# Patient Record
Sex: FEMALE | Race: WHITE | NOT HISPANIC OR LATINO
[De-identification: names, ages, dates, MRNs, and addresses within clinical notes are randomized per-mention and may not be internally consistent; named-entity substitution may affect disease eponyms.]

---

## 2017-06-13 ENCOUNTER — APPOINTMENT (OUTPATIENT)
Dept: DERMATOLOGY | Facility: CLINIC | Age: 52
End: 2017-06-13

## 2021-05-12 ENCOUNTER — APPOINTMENT (OUTPATIENT)
Dept: DERMATOLOGY | Facility: CLINIC | Age: 56
End: 2021-05-12
Payer: COMMERCIAL

## 2021-05-12 PROCEDURE — 99072 ADDL SUPL MATRL&STAF TM PHE: CPT

## 2021-05-12 PROCEDURE — 99203 OFFICE O/P NEW LOW 30 MIN: CPT

## 2022-04-24 ENCOUNTER — INPATIENT (INPATIENT)
Facility: HOSPITAL | Age: 57
LOS: 0 days | Discharge: HOME | DRG: 395 | End: 2022-04-25
Attending: HOSPITALIST | Admitting: STUDENT IN AN ORGANIZED HEALTH CARE EDUCATION/TRAINING PROGRAM
Payer: COMMERCIAL

## 2022-04-24 VITALS
OXYGEN SATURATION: 97 % | WEIGHT: 130.07 LBS | SYSTOLIC BLOOD PRESSURE: 147 MMHG | TEMPERATURE: 98 F | HEART RATE: 97 BPM | HEIGHT: 62 IN | RESPIRATION RATE: 16 BRPM | DIASTOLIC BLOOD PRESSURE: 96 MMHG

## 2022-04-24 DIAGNOSIS — K22.2 ESOPHAGEAL OBSTRUCTION: ICD-10-CM

## 2022-04-24 LAB
ALBUMIN SERPL ELPH-MCNC: 4.5 G/DL — SIGNIFICANT CHANGE UP (ref 3.3–5.2)
ALP SERPL-CCNC: 73 U/L — SIGNIFICANT CHANGE UP (ref 40–120)
ALT FLD-CCNC: 39 U/L — HIGH
ANION GAP SERPL CALC-SCNC: 19 MMOL/L — HIGH (ref 5–17)
APTT BLD: 27.6 SEC — SIGNIFICANT CHANGE UP (ref 27.5–35.5)
AST SERPL-CCNC: 29 U/L — SIGNIFICANT CHANGE UP
BASOPHILS # BLD AUTO: 0.08 K/UL — SIGNIFICANT CHANGE UP (ref 0–0.2)
BASOPHILS NFR BLD AUTO: 1.1 % — SIGNIFICANT CHANGE UP (ref 0–2)
BILIRUB SERPL-MCNC: 0.3 MG/DL — LOW (ref 0.4–2)
BUN SERPL-MCNC: 13.1 MG/DL — SIGNIFICANT CHANGE UP (ref 8–20)
CALCIUM SERPL-MCNC: 9.2 MG/DL — SIGNIFICANT CHANGE UP (ref 8.6–10.2)
CHLORIDE SERPL-SCNC: 102 MMOL/L — SIGNIFICANT CHANGE UP (ref 98–107)
CO2 SERPL-SCNC: 19 MMOL/L — LOW (ref 22–29)
CREAT SERPL-MCNC: 0.63 MG/DL — SIGNIFICANT CHANGE UP (ref 0.5–1.3)
EGFR: 105 ML/MIN/1.73M2 — SIGNIFICANT CHANGE UP
EOSINOPHIL # BLD AUTO: 0.27 K/UL — SIGNIFICANT CHANGE UP (ref 0–0.5)
EOSINOPHIL NFR BLD AUTO: 3.6 % — SIGNIFICANT CHANGE UP (ref 0–6)
FLUAV AG NPH QL: SIGNIFICANT CHANGE UP
FLUBV AG NPH QL: SIGNIFICANT CHANGE UP
GLUCOSE SERPL-MCNC: 123 MG/DL — HIGH (ref 70–99)
HCT VFR BLD CALC: 38.6 % — SIGNIFICANT CHANGE UP (ref 34.5–45)
HGB BLD-MCNC: 12.9 G/DL — SIGNIFICANT CHANGE UP (ref 11.5–15.5)
IMM GRANULOCYTES NFR BLD AUTO: 0.4 % — SIGNIFICANT CHANGE UP (ref 0–1.5)
INR BLD: 1.01 RATIO — SIGNIFICANT CHANGE UP (ref 0.88–1.16)
LYMPHOCYTES # BLD AUTO: 2.43 K/UL — SIGNIFICANT CHANGE UP (ref 1–3.3)
LYMPHOCYTES # BLD AUTO: 32.5 % — SIGNIFICANT CHANGE UP (ref 13–44)
MCHC RBC-ENTMCNC: 30.6 PG — SIGNIFICANT CHANGE UP (ref 27–34)
MCHC RBC-ENTMCNC: 33.4 GM/DL — SIGNIFICANT CHANGE UP (ref 32–36)
MCV RBC AUTO: 91.5 FL — SIGNIFICANT CHANGE UP (ref 80–100)
MONOCYTES # BLD AUTO: 0.45 K/UL — SIGNIFICANT CHANGE UP (ref 0–0.9)
MONOCYTES NFR BLD AUTO: 6 % — SIGNIFICANT CHANGE UP (ref 2–14)
NEUTROPHILS # BLD AUTO: 4.21 K/UL — SIGNIFICANT CHANGE UP (ref 1.8–7.4)
NEUTROPHILS NFR BLD AUTO: 56.4 % — SIGNIFICANT CHANGE UP (ref 43–77)
PLATELET # BLD AUTO: 399 K/UL — SIGNIFICANT CHANGE UP (ref 150–400)
POTASSIUM SERPL-MCNC: 3.1 MMOL/L — LOW (ref 3.5–5.3)
POTASSIUM SERPL-SCNC: 3.1 MMOL/L — LOW (ref 3.5–5.3)
PROT SERPL-MCNC: 7 G/DL — SIGNIFICANT CHANGE UP (ref 6.6–8.7)
PROTHROM AB SERPL-ACNC: 11.7 SEC — SIGNIFICANT CHANGE UP (ref 10.5–13.4)
RBC # BLD: 4.22 M/UL — SIGNIFICANT CHANGE UP (ref 3.8–5.2)
RBC # FLD: 12.2 % — SIGNIFICANT CHANGE UP (ref 10.3–14.5)
RSV RNA NPH QL NAA+NON-PROBE: SIGNIFICANT CHANGE UP
SARS-COV-2 RNA SPEC QL NAA+PROBE: SIGNIFICANT CHANGE UP
SODIUM SERPL-SCNC: 140 MMOL/L — SIGNIFICANT CHANGE UP (ref 135–145)
WBC # BLD: 7.47 K/UL — SIGNIFICANT CHANGE UP (ref 3.8–10.5)
WBC # FLD AUTO: 7.47 K/UL — SIGNIFICANT CHANGE UP (ref 3.8–10.5)

## 2022-04-24 PROCEDURE — 99285 EMERGENCY DEPT VISIT HI MDM: CPT

## 2022-04-24 PROCEDURE — 71045 X-RAY EXAM CHEST 1 VIEW: CPT | Mod: 26

## 2022-04-24 PROCEDURE — 99223 1ST HOSP IP/OBS HIGH 75: CPT

## 2022-04-24 RX ORDER — SODIUM CHLORIDE 9 MG/ML
1000 INJECTION, SOLUTION INTRAVENOUS
Refills: 0 | Status: DISCONTINUED | OUTPATIENT
Start: 2022-04-24 | End: 2022-04-24

## 2022-04-24 RX ORDER — PANTOPRAZOLE SODIUM 20 MG/1
40 TABLET, DELAYED RELEASE ORAL DAILY
Refills: 0 | Status: DISCONTINUED | OUTPATIENT
Start: 2022-04-24 | End: 2022-04-25

## 2022-04-24 RX ORDER — GLUCAGON INJECTION, SOLUTION 0.5 MG/.1ML
1 INJECTION, SOLUTION SUBCUTANEOUS ONCE
Refills: 0 | Status: COMPLETED | OUTPATIENT
Start: 2022-04-24 | End: 2022-04-24

## 2022-04-24 RX ORDER — PANTOPRAZOLE SODIUM 20 MG/1
40 TABLET, DELAYED RELEASE ORAL ONCE
Refills: 0 | Status: COMPLETED | OUTPATIENT
Start: 2022-04-24 | End: 2022-04-24

## 2022-04-24 RX ORDER — SODIUM CHLORIDE 9 MG/ML
1000 INJECTION INTRAMUSCULAR; INTRAVENOUS; SUBCUTANEOUS
Refills: 0 | Status: DISCONTINUED | OUTPATIENT
Start: 2022-04-24 | End: 2022-04-25

## 2022-04-24 RX ORDER — POTASSIUM CHLORIDE 20 MEQ
10 PACKET (EA) ORAL
Refills: 0 | Status: COMPLETED | OUTPATIENT
Start: 2022-04-24 | End: 2022-04-24

## 2022-04-24 RX ADMIN — PANTOPRAZOLE SODIUM 40 MILLIGRAM(S): 20 TABLET, DELAYED RELEASE ORAL at 23:23

## 2022-04-24 RX ADMIN — GLUCAGON INJECTION, SOLUTION 1 MILLIGRAM(S): 0.5 INJECTION, SOLUTION SUBCUTANEOUS at 19:00

## 2022-04-24 RX ADMIN — Medication 100 MILLIEQUIVALENT(S): at 22:57

## 2022-04-24 RX ADMIN — SODIUM CHLORIDE 75 MILLILITER(S): 9 INJECTION INTRAMUSCULAR; INTRAVENOUS; SUBCUTANEOUS at 23:25

## 2022-04-24 RX ADMIN — Medication 100 MILLIEQUIVALENT(S): at 21:07

## 2022-04-24 RX ADMIN — PANTOPRAZOLE SODIUM 40 MILLIGRAM(S): 20 TABLET, DELAYED RELEASE ORAL at 20:40

## 2022-04-24 NOTE — H&P ADULT - HISTORY OF PRESENT ILLNESS
57yoF hx HTN, GERD, anxiety disorder presenting with globus sensation in throat after eating chicken and artichoke pizza for dinner around 6PM tonight.  Pt attempted to drink water which she immediately regurgitated and came to hospital to seek evaluation due to persistent symptoms.  Pt denies any cough, excessive amount of secretions, chest pain or difficulty breathing.  She denies any similar episodes like this happening in the past but reports that her son did have episodes of recurrent food impaction. She reports that her son had an endoscopy which reportedly revealed inflammation. 57yoF hx HTN, GERD, anxiety disorder presenting with globus sensation in throat after eating chicken and artichoke pizza for dinner around 6PM tonight.  Pt attempted to drink water which she immediately regurgitated and came to hospital to seek evaluation due to persistent symptoms.  Pt denies any cough, excessive amount of secretions, chest pain or difficulty breathing.  She denies any similar episodes like this happening in the past but reports that her son did have what appears to be recurrent episodes of food impaction. She reports that her son had an endoscopy a few years ago which reportedly revealed inflammation.

## 2022-04-24 NOTE — PATIENT PROFILE ADULT - FALL HARM RISK - UNIVERSAL INTERVENTIONS
Bed in lowest position, wheels locked, appropriate side rails in place/Call bell, personal items and telephone in reach/Instruct patient to call for assistance before getting out of bed or chair/Non-slip footwear when patient is out of bed/[place] to call system/Physically safe environment - no spills, clutter or unnecessary equipment/Purposeful Proactive Rounding/Room/bathroom lighting operational, light cord in reach

## 2022-04-24 NOTE — ED ADULT NURSE NOTE - OBJECTIVE STATEMENT
pt with reports of having food stuck in her throat x 1 hour, states cannot tolerate water to help it pass. pt with reports of throat pain, no SOB or diff handling secretions. AOX3.

## 2022-04-24 NOTE — H&P ADULT - NSHPPHYSICALEXAM_GEN_ALL_CORE
Vital Signs Last 24 Hrs  T(C): 36.5 (24 Apr 2022 21:00), Max: 36.9 (24 Apr 2022 18:19)  T(F): 97.7 (24 Apr 2022 21:00), Max: 98.5 (24 Apr 2022 18:19)  HR: 100 (24 Apr 2022 21:00) (88 - 100)  BP: 138/86 (24 Apr 2022 21:00) (136/80 - 147/96)  BP(mean): --  RR: 17 (24 Apr 2022 21:00) (16 - 17)  SpO2: 96% (24 Apr 2022 21:00) (96% - 97%)    GENERAL:  Well-appearing, not in acute distress  EYES:  Clear conjunctiva, extraocular movement intact  ENT: Moist mucous membranes  RESP:  Non-labored breathing pattern, lungs clear to ausculation  CV: Regular rate and rhythm, no murmurs appreciated, no lower extremity edema  GI: Soft, non-tender, non-distended  NEURO: Awake, alert, conversant, upper and lower extremity strength 5/5, light touch sensation grossly intact  PSYCH: Calm, cooperative  SKIN: No rash or lesions, warm and dry

## 2022-04-24 NOTE — ED ADULT NURSE REASSESSMENT NOTE - NS ED NURSE REASSESS COMMENT FT1
as per GI pt to be scoped tomorrow, airway maintained and pt ambulatory as needed without difficulty. even and unlabored resps present. skin warm dry and intact.

## 2022-04-24 NOTE — H&P ADULT - ASSESSMENT
57yoF hx HTN, GERD, anxiety disorder presenting with globus sensation in throat after eating chicken and artichoke pizza for dinner around 6PM tonight    Esophageal food impaction  -Received IV glucagon in ED without improvement   -ED discussed case with GI, Dr. Beaulieu, plan for EGD in AM  -Strict NPO, no meds, ice chips or sips of water  -IV protonix 40mg daily  -IV Zofran PRN    Hypokalemia  -K+ 3.4, being repleted with IV K+  -Repeat BMP with Mg ordered for 2AM  -Will require correction to be medically optimized for anesthesia/prior to endoscopy     Hx HTN  -Has been normotensive, holding amlodipine 5mg while NPO    Hx anxiety disorder  -On Xanax 0.5mg PRN daily, interchange with IV Ativan 0.5mg PRN while NPO    Hx GERD  -On omeprazole 40mg daily, interchange with IV protonix 40mg     Prophylactic measure  -No pharmacological AC as pending endoscopy 57yoF hx HTN, GERD, anxiety disorder presenting with globus sensation in throat after eating chicken and artichoke pizza for dinner around 6PM tonight    Esophageal food impaction  -Received IV glucagon in ED without improvement   -ED discussed case with GI, Dr. Beaulieu, plan for EGD in AM  -Strict NPO, no meds, ice chips or sips of water  -IV protonix 40mg daily  -IV Zofran PRN  -Start maintenance NS at 75cc/hr x 14hr    Hypokalemia  -K+ 3.4, being repleted with IV K+  -Repeat BMP with Mg ordered for 2AM  -Will require correction to be medically optimized for anesthesia/prior to endoscopy     Hx HTN  -Has been normotensive, holding amlodipine 5mg while NPO    Hx anxiety disorder  -On Xanax 0.5mg PRN daily, interchange with IV Ativan 0.5mg PRN while NPO    Hx GERD  -On omeprazole 40mg daily, interchange with IV protonix 40mg     Prophylactic measure  -No pharmacological AC as pending endoscopy

## 2022-04-24 NOTE — ED ADULT NURSE NOTE - CHIEF COMPLAINT QUOTE
Paperwork signed and faxed.    
Physician results form dropped off at . Form in your mailbox to be signed.    
"something caught in my throat. I can breathe but feel like I cant swallow water."

## 2022-04-24 NOTE — ED PROVIDER NOTE - CLINICAL SUMMARY MEDICAL DECISION MAKING FREE TEXT BOX
labs and imaging reviewed. case d/w Dr. Beaulieu who recommended admission for EGD in AM.  Pt started on maintenance IVF and protonix. potasisum repleted. case d/w DR. Martinez for admission.

## 2022-04-24 NOTE — ED ADULT NURSE REASSESSMENT NOTE - NS ED NURSE REASSESS COMMENT FT1
pt remains AOX3, IV site patent, no airway difficulties. even and unlabored resps present. report handed off to [facility] RN at this time.

## 2022-04-24 NOTE — H&P ADULT - NSICDXFAMILYHX_GEN_ALL_CORE_FT
FAMILY HISTORY:  Child  Still living? Unknown  Family history of GI disorder, Age at diagnosis: Age Unknown

## 2022-04-24 NOTE — H&P ADULT - NSHPLABSRESULTS_GEN_ALL_CORE
12.9   7.47  )-----------( 399      ( 24 Apr 2022 19:01 )             38.6       04-24    140  |  102  |  13.1  ----------------------------<  123<H>  3.1<L>   |  19.0<L>  |  0.63    Ca    9.2      24 Apr 2022 19:01  Mg     2.0     04-24    TPro  7.0  /  Alb  4.5  /  TBili  0.3<L>  /  DBili  x   /  AST  29  /  ALT  39<H>  /  AlkPhos  73  04-24    EKG shows NSR, HR 95, peaked T waves in V2, high voltage complexes in multiple leads,

## 2022-04-24 NOTE — ED PROVIDER NOTE - OBJECTIVE STATEMENT
Pt is a 58 yo F co food stuck in throat. Pt states that she was out to dinner tonight eating chicken and artichoke pizza and pierogies. Pt states that after eating she felt like something became stuck in her throat. Pt states that she tried to drink water afterwards and it immediately came back up. Pt states that she is handling her secretions. no trouble speaking or breathing.  no other complaints.

## 2022-04-25 ENCOUNTER — RESULT REVIEW (OUTPATIENT)
Age: 57
End: 2022-04-25

## 2022-04-25 ENCOUNTER — TRANSCRIPTION ENCOUNTER (OUTPATIENT)
Age: 57
End: 2022-04-25

## 2022-04-25 VITALS
SYSTOLIC BLOOD PRESSURE: 141 MMHG | OXYGEN SATURATION: 97 % | DIASTOLIC BLOOD PRESSURE: 82 MMHG | RESPIRATION RATE: 18 BRPM | HEART RATE: 83 BPM | TEMPERATURE: 98 F

## 2022-04-25 LAB
ANION GAP SERPL CALC-SCNC: 11 MMOL/L — SIGNIFICANT CHANGE UP (ref 5–17)
BUN SERPL-MCNC: 15.3 MG/DL — SIGNIFICANT CHANGE UP (ref 8–20)
CALCIUM SERPL-MCNC: 8.7 MG/DL — SIGNIFICANT CHANGE UP (ref 8.6–10.2)
CHLORIDE SERPL-SCNC: 111 MMOL/L — HIGH (ref 98–107)
CO2 SERPL-SCNC: 22 MMOL/L — SIGNIFICANT CHANGE UP (ref 22–29)
CREAT SERPL-MCNC: 0.62 MG/DL — SIGNIFICANT CHANGE UP (ref 0.5–1.3)
EGFR: 105 ML/MIN/1.73M2 — SIGNIFICANT CHANGE UP
GLUCOSE SERPL-MCNC: 96 MG/DL — SIGNIFICANT CHANGE UP (ref 70–99)
MAGNESIUM SERPL-MCNC: 1.9 MG/DL — SIGNIFICANT CHANGE UP (ref 1.6–2.6)
MAGNESIUM SERPL-MCNC: 2 MG/DL — SIGNIFICANT CHANGE UP (ref 1.8–2.6)
POTASSIUM SERPL-MCNC: 4.1 MMOL/L — SIGNIFICANT CHANGE UP (ref 3.5–5.3)
POTASSIUM SERPL-SCNC: 4.1 MMOL/L — SIGNIFICANT CHANGE UP (ref 3.5–5.3)
SODIUM SERPL-SCNC: 144 MMOL/L — SIGNIFICANT CHANGE UP (ref 135–145)

## 2022-04-25 PROCEDURE — 83735 ASSAY OF MAGNESIUM: CPT

## 2022-04-25 PROCEDURE — 71045 X-RAY EXAM CHEST 1 VIEW: CPT

## 2022-04-25 PROCEDURE — 36415 COLL VENOUS BLD VENIPUNCTURE: CPT

## 2022-04-25 PROCEDURE — 86901 BLOOD TYPING SEROLOGIC RH(D): CPT

## 2022-04-25 PROCEDURE — 88342 IMHCHEM/IMCYTCHM 1ST ANTB: CPT | Mod: 26

## 2022-04-25 PROCEDURE — 85025 COMPLETE CBC W/AUTO DIFF WBC: CPT

## 2022-04-25 PROCEDURE — 99238 HOSP IP/OBS DSCHRG MGMT 30/<: CPT

## 2022-04-25 PROCEDURE — 99285 EMERGENCY DEPT VISIT HI MDM: CPT

## 2022-04-25 PROCEDURE — 80053 COMPREHEN METABOLIC PANEL: CPT

## 2022-04-25 PROCEDURE — 87637 SARSCOV2&INF A&B&RSV AMP PRB: CPT

## 2022-04-25 PROCEDURE — 88342 IMHCHEM/IMCYTCHM 1ST ANTB: CPT

## 2022-04-25 PROCEDURE — 84702 CHORIONIC GONADOTROPIN TEST: CPT

## 2022-04-25 PROCEDURE — 80048 BASIC METABOLIC PNL TOTAL CA: CPT

## 2022-04-25 PROCEDURE — 88305 TISSUE EXAM BY PATHOLOGIST: CPT | Mod: 26

## 2022-04-25 PROCEDURE — 96374 THER/PROPH/DIAG INJ IV PUSH: CPT

## 2022-04-25 PROCEDURE — 88305 TISSUE EXAM BY PATHOLOGIST: CPT

## 2022-04-25 PROCEDURE — 93005 ELECTROCARDIOGRAM TRACING: CPT

## 2022-04-25 PROCEDURE — 86850 RBC ANTIBODY SCREEN: CPT

## 2022-04-25 PROCEDURE — 85730 THROMBOPLASTIN TIME PARTIAL: CPT

## 2022-04-25 PROCEDURE — 86900 BLOOD TYPING SEROLOGIC ABO: CPT

## 2022-04-25 PROCEDURE — 85610 PROTHROMBIN TIME: CPT

## 2022-04-25 RX ORDER — OMEPRAZOLE 10 MG/1
1 CAPSULE, DELAYED RELEASE ORAL
Qty: 14 | Refills: 0
Start: 2022-04-25 | End: 2022-05-08

## 2022-04-25 RX ORDER — OMEPRAZOLE 10 MG/1
1 CAPSULE, DELAYED RELEASE ORAL
Qty: 0 | Refills: 0 | DISCHARGE

## 2022-04-25 RX ORDER — ALPRAZOLAM 0.25 MG
1 TABLET ORAL
Qty: 0 | Refills: 0 | DISCHARGE

## 2022-04-25 RX ORDER — ONDANSETRON 8 MG/1
4 TABLET, FILM COATED ORAL EVERY 6 HOURS
Refills: 0 | Status: DISCONTINUED | OUTPATIENT
Start: 2022-04-25 | End: 2022-04-25

## 2022-04-25 RX ORDER — AMLODIPINE BESYLATE 2.5 MG/1
1 TABLET ORAL
Qty: 0 | Refills: 0 | DISCHARGE

## 2022-04-25 RX ADMIN — PANTOPRAZOLE SODIUM 40 MILLIGRAM(S): 20 TABLET, DELAYED RELEASE ORAL at 12:40

## 2022-04-25 RX ADMIN — Medication 100 MILLIEQUIVALENT(S): at 00:10

## 2022-04-25 NOTE — CONSULT NOTE ADULT - NS ATTEND AMEND GEN_ALL_CORE FT
57 F  h/o GERD, HTN, anxiety  not on any blood thinners   No prior food impaction  Here with likely resolved food impaction  Plan:  EGD to eval for EoE, extent of esophageal damage  NPO  Anticipate discharge after procedure, likely later this morning  carafate PRN at home

## 2022-04-25 NOTE — DISCHARGE NOTE NURSING/CASE MANAGEMENT/SOCIAL WORK - PATIENT PORTAL LINK FT
You can access the FollowMyHealth Patient Portal offered by [facility] by registering at the following website: [email or web address]. By joining [employer]’s FollowMyHealth portal, you will also be able to view your health information using other applications (apps) compatible with our system.

## 2022-04-25 NOTE — DISCHARGE NOTE PROVIDER - CARE PROVIDER_API CALL
Slime Hankins)  Gastroenterology  [address] [place] [address] [id number]  Phone: [phone]  Fax: [phone]  Follow Up Time:     Primary Care Physician,   Phone: (   )    -  Fax: (   )    -  Follow Up Time: 1-3 days   Slime Hankins)  Gastroenterology  [address] [place] [address] [id number]  Phone: [phone]  Fax: [phone]  Follow Up Time: 1 week    Primary Care Physician,   Phone: (   )    -  Fax: (   )    -  Follow Up Time: 1-3 days

## 2022-04-25 NOTE — CHART NOTE - NSCHARTNOTEFT_GEN_A_CORE
EGD performed  No food bolus seen  Stable for discharge  f/u path as outpatient  advance diet - PPI qD for 1-2 weeks on discharge.

## 2022-04-25 NOTE — DISCHARGE NOTE PROVIDER - HOSPITAL COURSE
57yoF hx HTN, GERD, anxiety disorder presenting with globus sensation in throat after eating chicken and artichoke pizza for dinner around 6PM tonight. Patient was admitted esophageal food impaction, patient was treated with IV glucagon in ED without improvement.  GI consulted and patient successfully underwent EGD this morning.  Per GI patient is medically cleared for discharge and is to continue PPI QD for 2 weeks. For convenience the prescription was sent to [facility].  From a medical standpoint the above patient is medically stable for discharge home.            57yoF hx HTN, GERD, anxiety disorder presenting with globus sensation in throat after eating chicken and artichoke pizza for dinner around 6PM tonight. Patient was admitted esophageal food impaction, patient was treated with IV glucagon in ED without improvement.  GI consulted and patient successfully underwent EGD 4/15.  Per GI patient is medically cleared for discharge and is to continue PPI QD for 2 weeks. For convenience the prescription was sent to [facility].  From a medical standpoint the above patient is medically stable for discharge home.

## 2022-04-25 NOTE — DISCHARGE NOTE PROVIDER - PROVIDER TOKENS
PROVIDER:[TOKEN:[[id number]]],FREE:[LAST:[Primary Care Physician],PHONE:[(   )    -],FAX:[(   )    -],FOLLOWUP:[1-3 days]] PROVIDER:[TOKEN:[[id number]],FOLLOWUP:[1 week]],FREE:[LAST:[Primary Care Physician],PHONE:[(   )    -],FAX:[(   )    -],FOLLOWUP:[1-3 days]]

## 2022-04-25 NOTE — CONSULT NOTE ADULT - SUBJECTIVE AND OBJECTIVE BOX
HISTORY OF PRESENT ILLNESS: This is a 57y old woman with a past medical history significant for concern for food impaction.   Pt states she was out to dinner yesterday, ate something with chicken and cheese which felt stuck. This has never happened before. She tried to induce vomiting, unsuccessful. She was unable to tolerate water or saliva. She came to the ED yesterday. By midnight she felt the saliva was going down. This am she is in no acute distress, lying flat. Throat is sore. She does not have food or seasonal allergies, but thinks she is allergic to PCN. She takes Excedrin and Tylenol PRN HA and has GERD sx.     ROS: A 14-point review of systems was completed and was otherwise negative save what was reported in the HPI.    PAST MEDICAL/SURGICAL HISTORY:  Hypertension    GERD (gastroesophageal reflux disease)    Anxiety    No significant past surgical history      SOCIAL HISTORY:  - TOBACCO: Denies  - ALCOHOL: Denies  - ILLICIT DRUG USE: Denies    FAMILY HISTORY:  No known history of gastrointestinal or liver disease;  Family history of GI disorder (Child)  recurrent esophageal food impaction in son, s/p endoscopy showing &#x27;inflammation&#x27;        HOME MEDICATIONS:  amLODIPine 5 mg oral tablet: 1 tab(s) orally once a day (25 Apr 2022 01:47)  Multiple Vitamins oral tablet: 1 tab(s) orally once a day (25 Apr 2022 01:53)  omeprazole 40 mg oral delayed release capsule: 1 cap(s) orally once a day (25 Apr 2022 01:47)  Xanax 0.5 mg oral tablet: 1 tab(s) orally once a day, As Needed (25 Apr 2022 01:47)    INPATIENT MEDICATIONS:  MEDICATIONS  (STANDING):  pantoprazole  Injectable 40 milliGRAM(s) IV Push daily  sodium chloride 0.9%. 1000 milliLiter(s) (75 mL/Hr) IV Continuous <Continuous>    MEDICATIONS  (PRN):  LORazepam   Injectable 0.5 milliGRAM(s) IV Push daily PRN Anxiety  ondansetron Injectable 4 milliGRAM(s) IV Push every 6 hours PRN Nausea and/or Vomiting    ALLERGIES:  penicillin (Hives)    T(C): 36.6 (04-25-22 @ 04:28), Max: 36.9 (04-24-22 @ 18:19)  HR: 83 (04-25-22 @ 04:28) (83 - 100)  BP: 138/87 (04-25-22 @ 04:28) (136/80 - 147/96)  RR: 16 (04-25-22 @ 04:28) (16 - 17)  SpO2: 96% (04-25-22 @ 04:28) (96% - 97%)      PHYSICAL EXAM:  Constitutional: Well-developed, well-nourished, in no apparent distress  Eyes: Sclerae anicteric, conjunctivae normal  ENMT: Mucus membranes moist, no oropharyngeal thrush noted  Neck: No thyroid nodules appreciated, no significant cervical or supraclavicular lymphadenopathy  Respiratory: Breathing nonlabored; clear to auscultation  Cardiovascular: Regular rate and rhythm  Gastrointestinal: Soft, nontender, nondistended, normoactive bowel sounds; no hepatosplenomegaly appreciated; no rebound tenderness or involuntary guarding  Extremities: No clubbing, cyanosis or edema  Neurological: Alert and oriented to person, place and time; no asterixis  Skin: No jaundice  Musculoskeletal: No significant peripheral atrophy  Psychiatric: Affect and mood appropriate      LABS:             12.9   7.47  )-----------( 399      ( 04-24 @ 19:01 )             38.6       PT/INR - ( 24 Apr 2022 19:01 )   PT: 11.7 sec;   INR: 1.01 ratio         PTT - ( 24 Apr 2022 19:01 )  PTT:27.6 sec  04-25    144  |  111<H>  |  15.3  ----------------------------<  96  4.1   |  22.0  |  0.62    Ca    8.7      25 Apr 2022 01:55  Mg     1.9     04-25    TPro  7.0  /  Alb  4.5  /  TBili  0.3<L>  /  DBili  x   /  AST  29  /  ALT  39<H>  /  AlkPhos  73  04-24    LIVER FUNCTIONS - ( 24 Apr 2022 19:01 )  Alb: 4.5 g/dL / Pro: 7.0 g/dL / ALK PHOS: 73 U/L / ALT: 39 U/L / AST: 29 U/L / GGT: x                 She has no imaging in the system

## 2022-04-25 NOTE — DISCHARGE NOTE PROVIDER - NSDCCAREPROVSEEN_GEN_ALL_CORE_FT
Christofer, Greg Carcamo, Татьяна Blount, Chuck Avery, Sravan Mercado, Yazmin Martinez, Cathleen OCHOA

## 2022-04-25 NOTE — DISCHARGE NOTE PROVIDER - ATTENDING DISCHARGE PHYSICAL EXAMINATION:
PHYSICAL EXAM:    GENERAL: NAD, well-groomed, well-developed, thin built  HEAD:  Atraumatic, Normocephalic  EYES: EOMI, PERRLA, conjunctiva and sclera clear  ENMT: Good dentition, No lesions  NECK: Supple, No JVD  NERVOUS SYSTEM:  Alert & Oriented X3, Good concentration  CHEST/LUNG: Clear to percussion bilaterally; No rales, rhonchi  HEART: Regular rate and rhythm; No murmurs  ABDOMEN: Soft, Nontender, Nondistended; Bowel sounds present  EXTREMITIES:  No clubbing, cyanosis, or edema  SKIN: No rashes or lesions

## 2022-04-25 NOTE — DISCHARGE NOTE PROVIDER - NSDCCPCAREPLAN_GEN_ALL_CORE_FT
PRINCIPAL DISCHARGE DIAGNOSIS  Diagnosis: Esophageal obstruction due to food impaction  Assessment and Plan of Treatment: You were admitted for further evaluation of an esophageal obstruction secondary to food impaction.  During your admission a GI specialist was consulted and you successfully underwent an Upper Endoscopy this morning which was within normal limits.  GI recommendds to continue your protonix daily for 2 weeks, in addition be sure to take smaller bites and be sure chew and swallow all of your food thoroughly.  For convenience your prescription was sent to [facility] located on the 1st floor within the hospital. Please be sure to eat slowly to prevent any food particles from being incompletely chewed. Please continue to take all of your medications as directed, maintain adequate hydration and nutrition, and please follow up with all of your healthcare providers as directed.

## 2022-04-25 NOTE — DISCHARGE NOTE PROVIDER - NSDCMRMEDTOKEN_GEN_ALL_CORE_FT
amLODIPine 5 mg oral tablet: 1 tab(s) orally once a day  Multiple Vitamins oral tablet: 1 tab(s) orally once a day  omeprazole 40 mg oral delayed release capsule: 1 cap(s) orally once a day  Xanax 0.5 mg oral tablet: 1 tab(s) orally once a day, As Needed

## 2022-04-27 PROBLEM — I10 ESSENTIAL (PRIMARY) HYPERTENSION: Chronic | Status: ACTIVE | Noted: 2022-04-25

## 2022-04-27 PROBLEM — F41.9 ANXIETY DISORDER, UNSPECIFIED: Chronic | Status: ACTIVE | Noted: 2022-04-25

## 2022-04-27 PROBLEM — K21.9 GASTRO-ESOPHAGEAL REFLUX DISEASE WITHOUT ESOPHAGITIS: Chronic | Status: ACTIVE | Noted: 2022-04-25

## 2022-04-29 LAB — SURGICAL PATHOLOGY STUDY: SIGNIFICANT CHANGE UP

## 2022-05-22 PROBLEM — R13.10 DYSPHAGIA: Status: ACTIVE | Noted: 2022-05-22

## 2022-05-22 PROBLEM — K21.9 GERD (GASTROESOPHAGEAL REFLUX DISEASE): Status: ACTIVE | Noted: 2022-05-22

## 2022-05-23 ENCOUNTER — APPOINTMENT (OUTPATIENT)
Dept: GASTROENTEROLOGY | Facility: CLINIC | Age: 57
End: 2022-05-23
Payer: COMMERCIAL

## 2022-05-23 VITALS
BODY MASS INDEX: 24.84 KG/M2 | HEIGHT: 62 IN | HEART RATE: 68 BPM | OXYGEN SATURATION: 98 % | DIASTOLIC BLOOD PRESSURE: 82 MMHG | WEIGHT: 135 LBS | SYSTOLIC BLOOD PRESSURE: 126 MMHG | RESPIRATION RATE: 14 BRPM

## 2022-05-23 DIAGNOSIS — R13.10 DYSPHAGIA, UNSPECIFIED: ICD-10-CM

## 2022-05-23 DIAGNOSIS — K21.9 GASTRO-ESOPHAGEAL REFLUX DISEASE W/OUT ESOPHAGITIS: ICD-10-CM

## 2022-05-23 PROCEDURE — 99203 OFFICE O/P NEW LOW 30 MIN: CPT

## 2022-05-23 RX ORDER — ESOMEPRAZOLE MAGNESIUM 40 MG/1
40 CAPSULE, DELAYED RELEASE ORAL DAILY
Qty: 30 | Refills: 1 | Status: ACTIVE | COMMUNITY
Start: 2022-05-23 | End: 1900-01-01

## 2022-05-23 NOTE — HISTORY OF PRESENT ILLNESS
[de-identified] : Report consistent with GERD.  57-year-old woman known to me from the hospital here for follow-up.                         \par 57yoF hx HTN, GERD, anxiety disorder presenting with globus sensation in throat after eating chicken and artichoke pizza for dinner around 6PM tonight.  EGD –ve for food bolus.  \par Path report is as below consistent with GERD only.\par \par 1. D2, biopsy: \par \par -Duodenal mucosa with villous preservation \par \par -No increase in intraepithelial lymphocytes \par \par   \par \par 2. Duodenal bulb, biopsy: \par \par -Duodenal mucosa with villous preservation \par \par -No increase in intraepithelial lymphocytes \par \par   \par \par 3. Stomach, random biopsy: \par \par -Mild chronic gastritis \par \par -No H. pylori seen with immunohistochemical stain \par \par   \par \par 4. Distal esophagus, biopsy: \par \par -Squamous mucosa with chronic inflammation and increased eosinophils (> \par \par 20 / high power field) \par \par   \par \par 5. Proximal esophagus, biopsy: \par \par -Squamous mucosa, no abnormality seen                                                                                            \par The patient reports not feeling well recently.  Her sister, who had juvenile diabetes and was on dialysis,  a few days ago.  This was not entirely unexpected but is still quite sad.  She has not been eating very well as a result.  She has noticed that chocolate and red wine are big triggers.  She has not changed her weight.  She notes her bowel pattern is irregular at baseline and stayed the same.  She has not seen any dark stools or blood in the stool.  Regarding colon cancer screening, her father had prostate cancer which metastasized to the colon as a result she had been getting colonoscopies every 3 to 5 years.  She is currently on year 4 of a 5-year follow-up interval.  I explained that probably the primary was prostate and she is getting tested excessively, she will take this under advisement regarding the timing of her next colonoscopy.\par Regarding upper symptoms follow-up she denies dysphagia but reports a persistent burning feeling in her mid chest as well as occasionally in her left upper quadrant.  She notes this is worse at night.  She also notes that the omeprazole which she was discharged from the hospital with caused headache and blurry vision which stopped after she stopped the medication.  I looked on up-to-date and headaches are listed as a side effect of omeprazole and 7% of patients and blurry vision is listed in some postmarketing studies.  We discussed these findings, I do not think they are a class effect, I still think PPIs would be the best option considering her path findings.  She is willing to try another PPI.\par During the hospital stay, we had spoken about how her son had several episodes of dysphagia.  He had endoscopies performed with biopsies.  She was going to look into whether these biopsies showed eosinophilic esophagitis.  She is able to confirm today that those biopsies were negative for eosinophilic esophagitis, as I was able to confirm that hers were negative as well.  She has 20 eosinophils per high-power field in the distal esophagus but none in the proximal esophagus which is consistent with GERD.                                                                                                                                                                                                                                                                                                                                                                                                                                                                            [FreeTextEntry1] : 57-year-old lady here for post ER follow-up after food impaction.  The food impaction resolved on its own.  The past from that procedure was normal.

## 2022-05-23 NOTE — ASSESSMENT
[FreeTextEntry1] : 57-year-old woman recently seen in the emergency room April 24 for dysphagia after eating chicken.  EGD negative for food bolus.

## 2022-09-08 ENCOUNTER — APPOINTMENT (OUTPATIENT)
Dept: DERMATOLOGY | Facility: CLINIC | Age: 57
End: 2022-09-08

## 2022-09-08 PROCEDURE — 99213 OFFICE O/P EST LOW 20 MIN: CPT

## 2022-09-15 ENCOUNTER — APPOINTMENT (OUTPATIENT)
Dept: GASTROENTEROLOGY | Facility: CLINIC | Age: 57
End: 2022-09-15

## 2022-12-01 ENCOUNTER — APPOINTMENT (OUTPATIENT)
Dept: DERMATOLOGY | Facility: CLINIC | Age: 57
End: 2022-12-01

## 2022-12-01 PROCEDURE — 99213 OFFICE O/P EST LOW 20 MIN: CPT

## 2024-01-08 ENCOUNTER — APPOINTMENT (OUTPATIENT)
Dept: DERMATOLOGY | Facility: CLINIC | Age: 59
End: 2024-01-08
Payer: COMMERCIAL

## 2024-01-08 PROCEDURE — 99213 OFFICE O/P EST LOW 20 MIN: CPT

## 2024-01-29 ENCOUNTER — APPOINTMENT (OUTPATIENT)
Dept: DERMATOLOGY | Facility: CLINIC | Age: 59
End: 2024-01-29
Payer: COMMERCIAL

## 2024-01-29 PROCEDURE — 95044 PATCH/APPLICATION TESTS: CPT

## 2024-01-31 ENCOUNTER — APPOINTMENT (OUTPATIENT)
Dept: DERMATOLOGY | Facility: CLINIC | Age: 59
End: 2024-01-31
Payer: COMMERCIAL

## 2024-01-31 PROCEDURE — ZZZZZ: CPT

## 2024-02-02 ENCOUNTER — APPOINTMENT (OUTPATIENT)
Dept: DERMATOLOGY | Facility: CLINIC | Age: 59
End: 2024-02-02
Payer: COMMERCIAL

## 2024-02-02 PROCEDURE — 99213 OFFICE O/P EST LOW 20 MIN: CPT

## 2025-01-17 ENCOUNTER — APPOINTMENT (OUTPATIENT)
Dept: DERMATOLOGY | Facility: CLINIC | Age: 60
End: 2025-01-17

## 2025-01-17 PROCEDURE — 99213 OFFICE O/P EST LOW 20 MIN: CPT

## (undated) DEVICE — FORCEP RADIAL JAW 4 W NDL 2.4MM 2.8MM 240CM ORANGE DISP

## (undated) DEVICE — VALVE ENDOSCOPE DEFENDO SINGLE USE

## (undated) DEVICE — BITE BLOCK ORAL